# Patient Record
Sex: FEMALE | Race: WHITE | NOT HISPANIC OR LATINO | Employment: FULL TIME
[De-identification: names, ages, dates, MRNs, and addresses within clinical notes are randomized per-mention and may not be internally consistent; named-entity substitution may affect disease eponyms.]

---

## 2021-04-24 ENCOUNTER — HEALTH MAINTENANCE LETTER (OUTPATIENT)
Age: 41
End: 2021-04-24

## 2021-08-08 ENCOUNTER — NURSE TRIAGE (OUTPATIENT)
Dept: NURSING | Facility: CLINIC | Age: 41
End: 2021-08-08

## 2021-08-08 ENCOUNTER — OFFICE VISIT (OUTPATIENT)
Dept: URGENT CARE | Facility: URGENT CARE | Age: 41
End: 2021-08-08
Payer: COMMERCIAL

## 2021-08-08 VITALS
TEMPERATURE: 97.9 F | WEIGHT: 140 LBS | DIASTOLIC BLOOD PRESSURE: 68 MMHG | SYSTOLIC BLOOD PRESSURE: 103 MMHG | HEART RATE: 93 BPM | OXYGEN SATURATION: 96 %
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
## 2021-08-08 PROCEDURE — 99203 OFFICE O/P NEW LOW 30 MIN: CPT | Performed by: FAMILY MEDICINE

## 2021-08-08 RX ORDER — DOXYCYCLINE 100 MG/1
100 CAPSULE ORAL 2 TIMES DAILY
Qty: 42 CAPSULE | Refills: 0 | Status: SHIPPED | OUTPATIENT
Start: 2021-08-08 | End: 2021-08-29

## 2021-08-08 NOTE — PATIENT INSTRUCTIONS
"  Patient Education     Tick Bites  Ticks are small arachnids that feed on the blood of rodents, rabbits, birds, deer, dogs, and people. A tick bite may cause redness, itching, and slight swelling at the site. Sometimes you may have no reaction where the tick bit you.  Ticks transmit disease when microbes in their saliva get into your skin and blood. There are over 800 species of ticks. But only two families of ticks, hard ticks and soft ticks, are known to transmit diseases to humans. Ticks often transmit a disease near the end of a meal. The hard ticks tend to attach and feed for hours to days. It may take hours before a hard tick transmits microbes. Soft ticks often feed for less than 1 hour and can transmit diseases quickly. The bites themselves aren't cause for concern. But ticks can carry and pass on 12 different illnesses. These include Lyme disease and [place] spotted fever.  Symptoms of tick-related diseases vary depending on the disease. The most common symptoms are:    Fever    Chills    Aches and pains such as headache, extreme tiredness (fatigue), and muscle aches    Joint pain (with Lyme disease)    Rash     A \"bull's eye\" rash is a common symptom of Lyme disease.   How to remove a tick  Not all ticks carry disease. A tick attached to you anywhere from minutes to days may infect you, depending on the type of tick and the germs it carries. If you find a tick, don't panic.    Try to carefully remove the tick with tweezers.    Grasp the tick near its head as close to the skin s surface as possible. Pull without twisting and don't crush the body.    After removing the tick, thoroughly clean the bite area and your hands with rubbing alcohol or soap and water.    Put a live tick in alcohol, or in a sealed bag or container, or flush it down the toilet.  When to get medical care  If you can't easily remove the tick or if you leave the head in your skin, get medical care right away.  Tick paralysis is a " rare disease thought to be caused by a toxin in tick saliva. The symptoms include:    Weakness    Paralysis    Confusion    Fever    Numbness    Headaches    Rashes  If you or someone bitten by a tick has these symptoms, get medical care right away. Removing the tick stops the symptoms in about 24 hours.  If you have a rash or fever within a few weeks of removing a tick, see your healthcare provider. Tell the provider about your recent tick bite, when the bite occurred, and where you most likely acquired the tick.    To prevent disease, you may be given antibiotics. Both Lyme disease and [place] spotted fever respond quickly to these medicines.    You may be asked to see your healthcare provider for a blood test. This is to check for Lyme or another tick-related disease.  Follow-up care  Some states and [facility] have services that test ticks for Lyme disease and other diseases. Check with your local officials to see if this service is available in your area.  If you remove a tick yourself, watch for signs of a tick-borne illness. Symptoms may show up in a few days or weeks after a bite. Call your healthcare provider if you notice any of the following:    Rash. This may spread outward in a ring from a hard, white lump. Or it may move up your arms and legs to your chest.    Fever    Chills    Body aches, joint swelling, and pain    Severe headache  [employer] last reviewed this educational content on 8/1/2019 2000-2021 [employer]. All rights reserved. This information is not intended as a substitute for professional medical care. Always follow your healthcare professional's instructions.

## 2021-08-08 NOTE — TELEPHONE ENCOUNTER
Tick yesterday. Black legged tick.   Unknown how long the tick was attached. She thinks it was over 24 hours.   Tick is on the back and not able to see if she removed the head. Advised [facility] for evaluation. She will go to [facility] bc of the anxiety of having a tick, she wants doxycycline.     COVID 19 Nurse Triage Plan/Patient Instructions    Please be aware that novel coronavirus (COVID-19) may be circulating in the community. If you develop symptoms such as fever, cough, or SOB or if you have concerns about the presence of another infection including coronavirus (COVID-19), please contact your health care provider or visit [email or web address].     Disposition/Instructions    In-Person Visit with provider recommended. Reference Visit Selection Guide.    Thank you for taking steps to prevent the spread of this virus.  o Limit your contact with others.  o Wear a simple mask to cover your cough.  o Wash your hands well and often.    Resources    [facility]: About COVID-19: [email or web address]    CDC: What to Do If You're Sick: www.cdc.gov/coronavirus/2019-ncov/about/steps-when-sick.html    CDC: Ending Home Isolation: www.cdc.gov/coronavirus/2019-ncov/hcp/disposition-in-home-patients.html     CDC: Caring for Someone: www.cdc.gov/coronavirus/2019-ncov/if-you-are-sick/care-for-someone.html     [facility]: Interim Guidance for Hospital Discharge to Home: www.health.[facility].mn.us/diseases/coronavirus/hcp/hospdischarge.pdf    [facility] clinical trials (COVID-19 research studies): [email or web address]-trials     Below are the COVID-19 hotlines at the [facility] of Health ([facility]). Interpreters are available.   o For health questions: Call 693-403-2237 or 1-517.641.9264 (7 a.m. to 7 p.m.)  o For questions about schools and childcare: Call 015-328-9878 or 1-853.585.7884 (7 a.m. to 7 p.m.)                     Nadiya Mast RN on 8/8/2021 at 7:42 AM    Reason for Disposition     Can't remove tick's head that was broken off in the skin (after trying Care Advice)     She is unsure if the head has been removed and not looking as its on her back.    Additional Information    Negative: Sounds like a life-threatening emergency to the triager    Negative: Not a tick bite    Negative: [1] 2 to 14 days following tick bite AND [2] severe headache with fever occurs    Negative: [1] 2 to 14 days following tick bite AND [2] widespread rash with fever occurs    Negative: Patient sounds very sick or weak to the triager    Negative: [1] Fever AND [2] area is very tender to touch    Negative: [1] Fever AND [2] red area    Negative: [1] Red streak or red line AND [2] length > 2 inches (5 cm)    Negative: Can't remove live tick (after trying Care Advice)    Protocols used: TICK BITE-A-

## 2021-08-09 NOTE — PROGRESS NOTES
SUBJECTIVE:  Andressa Servin, a 40 year old female scheduled an appointment to discuss the following issues:  Tick bite, initial encounter    Medical, social, surgical, and family histories reviewed.     Urgent Care   Tick Bite (c/o tick bite for 2 days)    Tick bite on her back noticed 2 days ago.  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  Unsure of the length of tick attachment.  No joint pain.  No erythema migrans or rash.    ROS:  See HPI.  No nausea/vomiting.  No fever/chills.  No chest pain/SOB.  No BM/urine problems.  No dizziness or syncope.      OBJECTIVE:  /68   Pulse 93   Temp 97.9  F (36.6  C) (Tympanic)   Wt 63.5 kg (140 lb)   LMP 08/02/2021   SpO2 96%   EXAM:  GENERAL APPEARANCE: alert and no distress, afebrile  HENT: normal and no adenopathy  RESP: lungs clear to auscultation - no rales, rhonchi or wheezes  CV: regular rates and rhythm, normal S1 S2, no S3 or S4 and no murmur, click or rub  ABDOMEN: soft, non-tender  MS: extremities normal- no gross deformities noted; back--no rash or lesion noted  SKIN: no suspicious lesions or rashes  NEURO: Normal strength and tone, mentation intact and speech normal      ASSESSMENT/PLAN:  (W57.XXXA) Tick bite, initial encounter  (primary encounter diagnosis)  Comment: advised that usually 2 tabs of Doxycycline 100mg PO is sufficient for prophylaxis.  Plan: doxycycline hyclate (VIBRAMYCIN) 100 MG capsule  Warned of possible side effects of Doxycycline.   Pt to f/up PCP if new problems arise.  Warning signs and symptoms explained---be seen ASAP if worsening.

## 2021-10-09 ENCOUNTER — HEALTH MAINTENANCE LETTER (OUTPATIENT)
Age: 41
End: 2021-10-09

## 2022-05-16 ENCOUNTER — HEALTH MAINTENANCE LETTER (OUTPATIENT)
Age: 42
End: 2022-05-16

## 2022-09-11 ENCOUNTER — HEALTH MAINTENANCE LETTER (OUTPATIENT)
Age: 42
End: 2022-09-11

## 2022-12-10 PROCEDURE — 99234 HOSP IP/OBS SM DT SF/LOW 45: CPT | Performed by: PHYSICIAN ASSISTANT

## 2022-12-10 PROCEDURE — 99285 EMERGENCY DEPT VISIT HI MDM: CPT | Mod: CS,25

## 2022-12-10 PROCEDURE — C9803 HOPD COVID-19 SPEC COLLECT: HCPCS

## 2022-12-10 PROCEDURE — 99285 EMERGENCY DEPT VISIT HI MDM: CPT | Mod: CS | Performed by: EMERGENCY MEDICINE

## 2022-12-11 ENCOUNTER — APPOINTMENT (OUTPATIENT)
Dept: ULTRASOUND IMAGING | Facility: CLINIC | Age: 42
End: 2022-12-11
Attending: EMERGENCY MEDICINE
Payer: COMMERCIAL

## 2022-12-11 ENCOUNTER — HOSPITAL ENCOUNTER (OUTPATIENT)
Facility: CLINIC | Age: 42
Setting detail: OBSERVATION
Discharge: HOME | End: 2022-12-11
Attending: EMERGENCY MEDICINE | Admitting: PHYSICIAN ASSISTANT
Payer: COMMERCIAL

## 2022-12-11 ENCOUNTER — APPOINTMENT (OUTPATIENT)
Dept: CT IMAGING | Facility: CLINIC | Age: 42
End: 2022-12-11
Attending: EMERGENCY MEDICINE
Payer: COMMERCIAL

## 2022-12-11 ENCOUNTER — APPOINTMENT (OUTPATIENT)
Dept: MRI IMAGING | Facility: CLINIC | Age: 42
End: 2022-12-11
Attending: EMERGENCY MEDICINE
Payer: COMMERCIAL

## 2022-12-11 VITALS
TEMPERATURE: 97.5 F | BODY MASS INDEX: 24.11 KG/M2 | SYSTOLIC BLOOD PRESSURE: 106 MMHG | HEIGHT: 66 IN | RESPIRATION RATE: 16 BRPM | HEART RATE: 80 BPM | DIASTOLIC BLOOD PRESSURE: 74 MMHG | OXYGEN SATURATION: 100 % | WEIGHT: 150 LBS

## 2022-12-11 DIAGNOSIS — Z11.52 ENCOUNTER FOR SCREENING LABORATORY TESTING FOR SEVERE ACUTE RESPIRATORY SYNDROME CORONAVIRUS 2 (SARS-COV-2): ICD-10-CM

## 2022-12-11 DIAGNOSIS — K80.80 BILIARY CALCULUS OF OTHER SITE WITHOUT OBSTRUCTION: ICD-10-CM

## 2022-12-11 LAB
ALBUMIN SERPL BCG-MCNC: 4.2 G/DL (ref 3.5–5.2)
ALBUMIN UR-MCNC: 30 MG/DL
ALP SERPL-CCNC: 49 U/L (ref 35–104)
ALT SERPL W P-5'-P-CCNC: 13 U/L (ref 10–35)
ANION GAP SERPL CALCULATED.3IONS-SCNC: 13 MMOL/L (ref 7–15)
APPEARANCE UR: ABNORMAL
AST SERPL W P-5'-P-CCNC: 29 U/L (ref 10–35)
BACTERIA #/AREA URNS HPF: ABNORMAL /HPF
BASOPHILS # BLD AUTO: 0.1 10E3/UL (ref 0–0.2)
BASOPHILS NFR BLD AUTO: 0 %
BILIRUB SERPL-MCNC: 0.2 MG/DL
BILIRUB UR QL STRIP: NEGATIVE
BUN SERPL-MCNC: 11.8 MG/DL (ref 6–20)
CALCIUM SERPL-MCNC: 9.2 MG/DL (ref 8.6–10)
CHLORIDE SERPL-SCNC: 106 MMOL/L (ref 98–107)
COLOR UR AUTO: YELLOW
CREAT SERPL-MCNC: 0.73 MG/DL (ref 0.51–0.95)
CRP SERPL-MCNC: <3 MG/L
DEPRECATED HCO3 PLAS-SCNC: 21 MMOL/L (ref 22–29)
EOSINOPHIL # BLD AUTO: 0.1 10E3/UL (ref 0–0.7)
EOSINOPHIL NFR BLD AUTO: 1 %
ERYTHROCYTE [DISTWIDTH] IN BLOOD BY AUTOMATED COUNT: 15 % (ref 10–15)
GFR SERPL CREATININE-BSD FRML MDRD: >90 ML/MIN/1.73M2
GLUCOSE SERPL-MCNC: 158 MG/DL (ref 70–99)
GLUCOSE UR STRIP-MCNC: NEGATIVE MG/DL
HCG SERPL QL: NEGATIVE
HCT VFR BLD AUTO: 35.8 % (ref 35–47)
HGB BLD-MCNC: 11 G/DL (ref 11.7–15.7)
HGB UR QL STRIP: NEGATIVE
HOLD SPECIMEN: NORMAL
IMM GRANULOCYTES # BLD: 0.1 10E3/UL
IMM GRANULOCYTES NFR BLD: 0 %
KETONES UR STRIP-MCNC: NEGATIVE MG/DL
LEUKOCYTE ESTERASE UR QL STRIP: ABNORMAL
LIPASE SERPL-CCNC: 51 U/L (ref 13–60)
LYMPHOCYTES # BLD AUTO: 2.6 10E3/UL (ref 0.8–5.3)
LYMPHOCYTES NFR BLD AUTO: 22 %
MAGNESIUM SERPL-MCNC: 1.8 MG/DL (ref 1.7–2.3)
MCH RBC QN AUTO: 24.2 PG (ref 26.5–33)
MCHC RBC AUTO-ENTMCNC: 30.7 G/DL (ref 31.5–36.5)
MCV RBC AUTO: 79 FL (ref 78–100)
MONOCYTES # BLD AUTO: 0.7 10E3/UL (ref 0–1.3)
MONOCYTES NFR BLD AUTO: 6 %
MUCOUS THREADS #/AREA URNS LPF: PRESENT /LPF
NEUTROPHILS # BLD AUTO: 8.5 10E3/UL (ref 1.6–8.3)
NEUTROPHILS NFR BLD AUTO: 71 %
NITRATE UR QL: NEGATIVE
NRBC # BLD AUTO: 0 10E3/UL
NRBC BLD AUTO-RTO: 0 /100
PH UR STRIP: 5 [PH] (ref 5–7)
PHOSPHATE SERPL-MCNC: 3.6 MG/DL (ref 2.5–4.5)
PLATELET # BLD AUTO: 385 10E3/UL (ref 150–450)
POTASSIUM SERPL-SCNC: 4.2 MMOL/L (ref 3.4–5.3)
PROT SERPL-MCNC: 7 G/DL (ref 6.4–8.3)
RADIOLOGIST FLAGS: ABNORMAL
RBC # BLD AUTO: 4.55 10E6/UL (ref 3.8–5.2)
RBC URINE: 2 /HPF
SARS-COV-2 RNA RESP QL NAA+PROBE: NEGATIVE
SODIUM SERPL-SCNC: 140 MMOL/L (ref 136–145)
SP GR UR STRIP: 1.03 (ref 1–1.03)
SQUAMOUS EPITHELIAL: 5 /HPF
UROBILINOGEN UR STRIP-MCNC: NORMAL MG/DL
WBC # BLD AUTO: 12 10E3/UL (ref 4–11)
WBC URINE: 7 /HPF

## 2022-12-11 PROCEDURE — 85025 COMPLETE CBC W/AUTO DIFF WBC: CPT | Performed by: EMERGENCY MEDICINE

## 2022-12-11 PROCEDURE — 96361 HYDRATE IV INFUSION ADD-ON: CPT

## 2022-12-11 PROCEDURE — 96360 HYDRATION IV INFUSION INIT: CPT | Mod: XU

## 2022-12-11 PROCEDURE — G0378 HOSPITAL OBSERVATION PER HR: HCPCS

## 2022-12-11 PROCEDURE — 99204 OFFICE O/P NEW MOD 45 MIN: CPT | Performed by: SURGERY

## 2022-12-11 PROCEDURE — 84703 CHORIONIC GONADOTROPIN ASSAY: CPT | Performed by: EMERGENCY MEDICINE

## 2022-12-11 PROCEDURE — 74181 MRI ABDOMEN W/O CONTRAST: CPT | Mod: 26 | Performed by: RADIOLOGY

## 2022-12-11 PROCEDURE — 99204 OFFICE O/P NEW MOD 45 MIN: CPT | Mod: GC | Performed by: INTERNAL MEDICINE

## 2022-12-11 PROCEDURE — 250N000011 HC RX IP 250 OP 636: Performed by: EMERGENCY MEDICINE

## 2022-12-11 PROCEDURE — 83690 ASSAY OF LIPASE: CPT | Performed by: EMERGENCY MEDICINE

## 2022-12-11 PROCEDURE — 76705 ECHO EXAM OF ABDOMEN: CPT

## 2022-12-11 PROCEDURE — 36415 COLL VENOUS BLD VENIPUNCTURE: CPT | Performed by: EMERGENCY MEDICINE

## 2022-12-11 PROCEDURE — 76705 ECHO EXAM OF ABDOMEN: CPT | Mod: 26 | Performed by: RADIOLOGY

## 2022-12-11 PROCEDURE — 74181 MRI ABDOMEN W/O CONTRAST: CPT

## 2022-12-11 PROCEDURE — 81003 URINALYSIS AUTO W/O SCOPE: CPT | Performed by: EMERGENCY MEDICINE

## 2022-12-11 PROCEDURE — 74177 CT ABD & PELVIS W/CONTRAST: CPT

## 2022-12-11 PROCEDURE — 86140 C-REACTIVE PROTEIN: CPT | Performed by: PHYSICIAN ASSISTANT

## 2022-12-11 PROCEDURE — 258N000003 HC RX IP 258 OP 636: Performed by: PHYSICIAN ASSISTANT

## 2022-12-11 PROCEDURE — U0003 INFECTIOUS AGENT DETECTION BY NUCLEIC ACID (DNA OR RNA); SEVERE ACUTE RESPIRATORY SYNDROME CORONAVIRUS 2 (SARS-COV-2) (CORONAVIRUS DISEASE [COVID-19]), AMPLIFIED PROBE TECHNIQUE, MAKING USE OF HIGH THROUGHPUT TECHNOLOGIES AS DESCRIBED BY CMS-2020-01-R: HCPCS | Performed by: EMERGENCY MEDICINE

## 2022-12-11 PROCEDURE — 250N000013 HC RX MED GY IP 250 OP 250 PS 637: Performed by: PHYSICIAN ASSISTANT

## 2022-12-11 PROCEDURE — 84100 ASSAY OF PHOSPHORUS: CPT | Performed by: PHYSICIAN ASSISTANT

## 2022-12-11 PROCEDURE — 74177 CT ABD & PELVIS W/CONTRAST: CPT | Mod: 26 | Performed by: RADIOLOGY

## 2022-12-11 PROCEDURE — 80053 COMPREHEN METABOLIC PANEL: CPT | Performed by: EMERGENCY MEDICINE

## 2022-12-11 PROCEDURE — 83735 ASSAY OF MAGNESIUM: CPT | Performed by: PHYSICIAN ASSISTANT

## 2022-12-11 RX ORDER — PROCHLORPERAZINE MALEATE 10 MG
10 TABLET ORAL EVERY 6 HOURS PRN
Status: DISCONTINUED | OUTPATIENT
Start: 2022-12-11 | End: 2022-12-11 | Stop reason: HOSPADM

## 2022-12-11 RX ORDER — ONDANSETRON 4 MG/1
4 TABLET, ORALLY DISINTEGRATING ORAL EVERY 6 HOURS PRN
Status: DISCONTINUED | OUTPATIENT
Start: 2022-12-11 | End: 2022-12-11 | Stop reason: HOSPADM

## 2022-12-11 RX ORDER — IOPAMIDOL 755 MG/ML
73 INJECTION, SOLUTION INTRAVASCULAR ONCE
Status: COMPLETED | OUTPATIENT
Start: 2022-12-11 | End: 2022-12-11

## 2022-12-11 RX ORDER — KETOROLAC TROMETHAMINE 30 MG/ML
30 INJECTION, SOLUTION INTRAMUSCULAR; INTRAVENOUS EVERY 6 HOURS PRN
Status: DISCONTINUED | OUTPATIENT
Start: 2022-12-11 | End: 2022-12-11 | Stop reason: HOSPADM

## 2022-12-11 RX ORDER — NALOXONE HYDROCHLORIDE 0.4 MG/ML
0.4 INJECTION, SOLUTION INTRAMUSCULAR; INTRAVENOUS; SUBCUTANEOUS
Status: DISCONTINUED | OUTPATIENT
Start: 2022-12-11 | End: 2022-12-11 | Stop reason: HOSPADM

## 2022-12-11 RX ORDER — KETOROLAC TROMETHAMINE 30 MG/ML
30 INJECTION, SOLUTION INTRAMUSCULAR; INTRAVENOUS EVERY 6 HOURS PRN
Status: DISCONTINUED | OUTPATIENT
Start: 2022-12-11 | End: 2022-12-11

## 2022-12-11 RX ORDER — SODIUM CHLORIDE 9 MG/ML
INJECTION, SOLUTION INTRAVENOUS CONTINUOUS
Status: DISCONTINUED | OUTPATIENT
Start: 2022-12-11 | End: 2022-12-11 | Stop reason: HOSPADM

## 2022-12-11 RX ORDER — PROCHLORPERAZINE 25 MG
25 SUPPOSITORY, RECTAL RECTAL EVERY 12 HOURS PRN
Status: DISCONTINUED | OUTPATIENT
Start: 2022-12-11 | End: 2022-12-11 | Stop reason: HOSPADM

## 2022-12-11 RX ORDER — METOCLOPRAMIDE HYDROCHLORIDE 5 MG/ML
10 INJECTION INTRAMUSCULAR; INTRAVENOUS EVERY 6 HOURS PRN
Status: DISCONTINUED | OUTPATIENT
Start: 2022-12-11 | End: 2022-12-11 | Stop reason: HOSPADM

## 2022-12-11 RX ORDER — ACETAMINOPHEN 650 MG/1
650 SUPPOSITORY RECTAL EVERY 6 HOURS PRN
Status: DISCONTINUED | OUTPATIENT
Start: 2022-12-11 | End: 2022-12-11 | Stop reason: HOSPADM

## 2022-12-11 RX ORDER — NALOXONE HYDROCHLORIDE 0.4 MG/ML
0.2 INJECTION, SOLUTION INTRAMUSCULAR; INTRAVENOUS; SUBCUTANEOUS
Status: DISCONTINUED | OUTPATIENT
Start: 2022-12-11 | End: 2022-12-11 | Stop reason: HOSPADM

## 2022-12-11 RX ORDER — ONDANSETRON 2 MG/ML
4 INJECTION INTRAMUSCULAR; INTRAVENOUS EVERY 6 HOURS PRN
Status: DISCONTINUED | OUTPATIENT
Start: 2022-12-11 | End: 2022-12-11 | Stop reason: HOSPADM

## 2022-12-11 RX ORDER — ACETAMINOPHEN 325 MG/1
650 TABLET ORAL EVERY 6 HOURS PRN
Status: DISCONTINUED | OUTPATIENT
Start: 2022-12-11 | End: 2022-12-11

## 2022-12-11 RX ORDER — ACETAMINOPHEN 500 MG
1000 TABLET ORAL EVERY 8 HOURS PRN
Status: DISCONTINUED | OUTPATIENT
Start: 2022-12-11 | End: 2022-12-11 | Stop reason: HOSPADM

## 2022-12-11 RX ORDER — HYDROMORPHONE HYDROCHLORIDE 1 MG/ML
0.3 INJECTION, SOLUTION INTRAMUSCULAR; INTRAVENOUS; SUBCUTANEOUS EVERY 4 HOURS PRN
Status: DISCONTINUED | OUTPATIENT
Start: 2022-12-11 | End: 2022-12-11 | Stop reason: HOSPADM

## 2022-12-11 RX ADMIN — IOPAMIDOL 73 ML: 755 INJECTION, SOLUTION INTRAVENOUS at 02:12

## 2022-12-11 RX ADMIN — SODIUM CHLORIDE: 9 INJECTION, SOLUTION INTRAVENOUS at 07:54

## 2022-12-11 RX ADMIN — ACETAMINOPHEN 1000 MG: 500 TABLET ORAL at 13:39

## 2022-12-11 ASSESSMENT — ACTIVITIES OF DAILY LIVING (ADL)
ADLS_ACUITY_SCORE: 35
ADLS_ACUITY_SCORE: 33
ADLS_ACUITY_SCORE: 35
ADLS_ACUITY_SCORE: 31
ADLS_ACUITY_SCORE: 33

## 2022-12-11 NOTE — PROGRESS NOTES
"Goal Outcome Evaluation:  BP 95/60 (BP Location: Right arm)   Pulse 72   Temp 98.5  F (36.9  C)   Resp 18   Ht 1.676 m (5' 6\")   Wt 68 kg (150 lb)   SpO2 98%   BMI 24.21 kg/m        -diagnostic tests and consults completed and resulted-not met  -vital signs normal or at patient baseline-met  -tolerating oral intake to maintain hydration- not met, NPO for GI consult  -adequate pain control on oral analgesics- met, denies pain, has not received anlgesics  -returns to baseline functional status-met  -safe disposition plan has been identified-progressing  -MRCP completed-met  -GI consult completed-not met          "

## 2022-12-11 NOTE — CONSULTS
General Surgery Consult Note  12/11/2022     Date of admission: 12/11/2022    Reason for Consult: RUQ pain    Consulting Service: ED      Assessment/Plan:  41yo woman without significant past medical history who presents with one day of RUQ abdominal pain and vomiting, now resolved. Vitals and labs normal. Initial RUQ US with dilated CBD and cholelithiasis. GI consulted, MRCP shows cholelithiasis and adenomyomatosis without evidence of choledocholithiasis.     - can discharge and follow up in general surgery clinic for evaluation for elective cholecystectomy   - recommend follow up of anemia found on labs today    Discussed with chief resident and staff surgeon, who agree with plan.    Precious Horner MD  Surgery PGY-2     -----------------------------------------------------------  CC: abdominal pain    HPI: 41yo woman without PMHx who presents with one day of abdominal pain and vomiting. Pain began yesterday after dinner as epigastric, then migrated to RUQ. Did not resolve so she came into ED overnight. Early morning she had multiple episodes of emesis. She felt a similar pain over a year ago. Pain has since resolved. She feels hungry currently. No fevers, no CP or SOB, no change in BM or urination. No other concerns.     In ED, afebrile with normal vitals. WBC 12 otherwise labs normal. MRCP without choledocholithiasis. 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     ROS: Negative except mentioned above in HPI    Past Medical Hx:  None    Medications:  None    Past Surgical Hx:  None    Family Hx:  No family history of bleeding or clotting disorders    Social Hx:  Social History     Socioeconomic History     Marital status:      Spouse name: Not on file     Number of children: Not on file     Years of education: Not on file     Highest education level: Not on file   Occupational History     Not on  file   Tobacco Use     Smoking status: Never     Smokeless tobacco: Never   Substance and Sexual Activity     Alcohol use: Not on file     Drug use: Not on file     Sexual activity: Not on file   Other Topics Concern     Not on file   Social History Narrative     Not on file     Social Determinants of Health     Financial Resource Strain: Not on file   Food Insecurity: Not on file   Transportation Needs: Not on file   Physical Activity: Not on file   Stress: Not on file   Social Connections: Not on file   Intimate Partner Violence: Not on file   Housing Stability: Not on file        Vitals: Temp: 98.5  F (36.9  C) Temp src: Oral BP: 95/60 Pulse: 72   Resp: 18 SpO2: 98 % O2 Device: None (Room air)       Exam:  General: awake, interactive, NAD  Resp: breathing comfortably on room air, no respiratory distress  CV: RR, appears well perfused  GI: abdomen soft, nondistended, nontender to palpation of RUQ  Extremities: wwp  Neuro: A&O, cranial nerves grossly intact  Psych: appropriate affect    Labs/Imaging:  Results for orders placed or performed during the hospital encounter of 12/11/22 (from the past 24 hour(s))   [place] Draw    Narrative    The following orders were created for panel order [place] [facility].  Procedure                               Abnormality         Status                     ---------                               -----------         ------                     Extra Blue Top Tube[[id number]]                              Final result               Extra Red Top Tube[[id number]]                               Final result               Extra Green Top (Lithium...[[id number]]                      Final result               Extra Purple Top Tube[[id number]]                            Final result                 Please view results for these tests on the individual orders.   CBC with platelets differential    Narrative    The following orders were created for panel order CBC with platelets  differential.  Procedure                               Abnormality         Status                     ---------                               -----------         ------                     CBC with platelets and d...[[id number]]  Abnormal            Final result                 Please view results for these tests on the individual orders.   Comprehensive metabolic panel   Result Value Ref Range    Sodium 140 136 - 145 mmol/L    Potassium 4.2 3.4 - 5.3 mmol/L    Chloride 106 98 - 107 mmol/L    Carbon Dioxide (CO2) 21 (L) 22 - 29 mmol/L    Anion Gap 13 7 - 15 mmol/L    Urea Nitrogen 11.8 6.0 - 20.0 mg/dL    Creatinine 0.73 0.51 - 0.95 mg/dL    Calcium 9.2 8.6 - 10.0 mg/dL    Glucose 158 (H) 70 - 99 mg/dL    Alkaline Phosphatase 49 35 - 104 U/L    AST 29 10 - 35 U/L    ALT 13 10 - 35 U/L    Protein Total 7.0 6.4 - 8.3 g/dL    Albumin 4.2 3.5 - 5.2 g/dL    Bilirubin Total 0.2 <=1.2 mg/dL    GFR Estimate >90 >60 mL/min/1.73m2   Extra Blue Top Tube   Result Value Ref Range    Hold Specimen JIC    Extra Red Top Tube   Result Value Ref Range    Hold Specimen JIC    Extra Green Top (Lithium Heparin) Tube   Result Value Ref Range    Hold Specimen JIC    Extra Purple Top Tube   Result Value Ref Range    Hold Specimen JIC    CBC with platelets and differential   Result Value Ref Range    WBC Count 12.0 (H) 4.0 - 11.0 10e3/uL    RBC Count 4.55 3.80 - 5.20 10e6/uL    Hemoglobin 11.0 (L) 11.7 - 15.7 g/dL    Hematocrit 35.8 35.0 - 47.0 %    MCV 79 78 - 100 fL    MCH 24.2 (L) 26.5 - 33.0 pg    MCHC 30.7 (L) 31.5 - 36.5 g/dL    RDW 15.0 10.0 - 15.0 %    Platelet Count 385 150 - 450 10e3/uL    % Neutrophils 71 %    % Lymphocytes 22 %    % Monocytes 6 %    % Eosinophils 1 %    % Basophils 0 %    % Immature Granulocytes 0 %    NRBCs per 100 WBC 0 <1 /100    Absolute Neutrophils 8.5 (H) 1.6 - 8.3 10e3/uL    Absolute Lymphocytes 2.6 0.8 - 5.3 10e3/uL    Absolute Monocytes 0.7 0.0 - 1.3 10e3/uL    Absolute Eosinophils 0.1 0.0 - 0.7 10e3/uL     Absolute Basophils 0.1 0.0 - 0.2 10e3/uL    Absolute Immature Granulocytes 0.1 <=0.4 10e3/uL    Absolute NRBCs 0.0 10e3/uL   Lipase   Result Value Ref Range    Lipase 51 13 - 60 U/L   HCG qualitative pregnancy (blood)   Result Value Ref Range    hCG Serum Qualitative Negative Negative   Magnesium   Result Value Ref Range    Magnesium 1.8 1.7 - 2.3 mg/dL   Phosphorus   Result Value Ref Range    Phosphorus 3.6 2.5 - 4.5 mg/dL   CRP inflammation   Result Value Ref Range    CRP Inflammation <3.00 <5.00 mg/L   US Abdomen Limited (RUQ)   Result Value Ref Range    Radiologist flags Possible choledocholithiasis (AA)     Narrative    EXAMINATION: US ABDOMEN LIMITED, 12/11/2022 1:07 AM    COMPARISON: None.    HISTORY: ruq pain    TECHNIQUE: The abdomen was scanned in standard fashion with  specialized ultrasound transducer(s) using both grey scale and limited  color Doppler techniques.    FINDINGS:   Fluid: No evidence of ascites or pleural effusions.    Liver: The liver demonstrates normal echotexture, measuring 16.9 cm in  craniocaudal dimension. There is no focal mass. Main portal vein is  patent with antegrade flow.    Gallbladder: Multiple shadowing echogenic calculi within the  gallbladder lumen. Multiple immobile echogenic intramural foci with  comet tail artifacts likely representing adenomyomatosis. Gallbladder  wall has diffuse thickened measuring 4.5 mm, with some areas over 1  cm. Gallbladder is not distended. No pericholecystic fluid or wall  hyperemia. Negative sonographic Brown's sign.    Bile Ducts: Prominent right hepatic duct measuring 4 mm and left  hepatic duct measuring 3 mm.  The common bile duct measures 8.4 mm in  diameter.    Pancreas: Visualized portions of the head and body of the pancreas are  unremarkable.     Kidney: The right kidney measures 9.3 cm long. There is no  hydronephrosis or hydroureter, no shadowing renal calculi, cystic  lesion or mass.       Impression    IMPRESSION:   1.  Cholelithiasis with a thickened gallbladder wall and comet tail  artifact. This likely represents adenomyomatosis the gallbladder.   2. Common bile duct is dilated with mildly ectatic right and left  hepatic ducts, which may represent distal biliary obstruction.  Recommend MRCP or ERCP if there is clinical concern for  choledocholithiasis.  3. Multiple echogenic intraluminal foci with comet tail artifacts  likely represent adenomyomatosis of the gallbladder.  4. Borderline hepatomegaly.    [Critical Result: Possible choledocholithiasis]    Finding was identified on 12/11/2022 1:05 AM.     Dr. Сергей Medina was contacted by me on 12/11/2022 1:25 AM and  verbalized understanding of the critical result.     I have personally reviewed the examination and initial interpretation  and I agree with the findings.    BRYSON SEGURA MD         SYSTEM ID:  [id number]   CT Abdomen Pelvis w Contrast    Narrative    EXAM: CT ABDOMEN PELVIS W CONTRAST  LOCATION: [facility]  DATE/TIME: 12/11/2022 2:20 AM    INDICATION: ? choledocholithiasis. also with RLQ pain and elevated WBC  COMPARISON: Ultrasound 12/11/2022  TECHNIQUE: CT scan of the abdomen and pelvis was performed following injection of IV contrast. Multiplanar reformats were obtained. Dose reduction techniques were used.  CONTRAST: iopamidol (ISOVUE 370) solution 73 mL       FINDINGS:   LOWER CHEST: Mild basilar atelectasis.    HEPATOBILIARY: Mild biliary dilatation. Extra hepatic bile duct 9 mm. Common bile duct pancreatic head 6 mm, tapering distally. Hypodensity surrounding the gallbladder likely related to adenomyomatosis seen on ultrasound. Focal calcification at the   gallbladder fundus majority of cholelithiasis on ultrasound not seen on CT.    PANCREAS: Normal.    SPLEEN: Normal.    ADRENAL GLANDS: Normal.    KIDNEYS/BLADDER: Normal.    BOWEL: Normal caliber. Normal appendix. Portions of the colon are underdistended reducing  evaluation for wall thickening/colitis.    LYMPH NODES: Normal.    VASCULATURE: Unremarkable.    PELVIC ORGANS: Uterus and endometrium are prominent. Probable subserosal fibroid.    MUSCULOSKELETAL: Normal.      Impression    IMPRESSION:   1.  Cholelithiasis better seen sonographically. Mild biliary prominence.  2.  Findings suggestive of adenomyomatosis of the gallbladder.  3.  Underdistention of portions of the colon which produces evaluation for wall thickening/colitis.  4.  The appendix is normal. No bowel obstruction.  5.  Endometrial prominence. Consider nonurgent pelvic ultrasound follow-up. There is a small uterine fibroid.   UA with Microscopic reflex to Culture    Specimen: Urine, Midstream   Result Value Ref Range    Color Urine Yellow Colorless, Straw, Light Yellow, Yellow    Appearance Urine Cloudy (A) Clear    Glucose Urine Negative Negative mg/dL    Bilirubin Urine Negative Negative    Ketones Urine Negative Negative mg/dL    Specific Gravity Urine 1.035 1.003 - 1.035    Blood Urine Negative Negative    pH Urine 5.0 5.0 - 7.0    Protein Albumin Urine 30 (A) Negative mg/dL    Urobilinogen Urine Normal Normal, 2.0 mg/dL    Nitrite Urine Negative Negative    Leukocyte Esterase Urine Trace (A) Negative    Bacteria Urine Many (A) None Seen /HPF    Mucus Urine Present (A) None Seen /LPF    RBC Urine 2 <=2 /HPF    WBC Urine 7 (H) <=5 /HPF    Squamous Epithelials Urine 5 (H) <=1 /HPF    Narrative    Urine Culture not indicated   Asymptomatic COVID-19 Virus (Coronavirus) by PCR Nasopharyngeal    Specimen: Nasopharyngeal; Swab   Result Value Ref Range    SARS CoV2 PCR Negative Negative    Narrative    Testing was performed using the Xpert Xpress SARS-CoV-2 Assay on the Cep[employer] Gene-Xpert Instrument Systems. Additional information about this Emergency Use Authorization (EUA) assay can be found via the Lab Guide. This test should be ordered for the detection of SARS-CoV-2 in individuals who meet SARS-CoV-2  clinical and/or epidemiological criteria as well as from individuals without symptoms or other reasons to suspect COVID-19. Test performance for asymptomatic patients has only been established in anterior nasal swab specimens. This test is for in vitro diagnostic use under the FDA EUA for laboratories certified under CLIA to perform high complexity testing. This test has not been FDA cleared or approved. A negative result does not rule out the presence of PCR inhibitors in the specimen or target RNA concentration below the limit of detection for the assay. The possibility of a false negative should be considered if the patient's recent exposure or clinical presentation suggests COVID-19. This test was validated by [facility]. These Laboratories are certified under the Clinical Laboratory Improvement Amendments (CLIA) as qualified to perform high complexity testing.     MR Abdomen MRCP without Contrast    Narrative    MRCP Without Contrast    CLINICAL HISTORY: ? choledocholithiasis    DATE: 12/11/2022 10:13 AM    TECHNIQUE:  Images were acquired without intravenous gadolinium  contrast through the upper abdomen. The following MR images were  acquired without intravenous contrast: TrueFISP, multiplanar  T2-weighted, axial T1 in/out of phase, T2-weighted MRCP images, axial  diffusion-weighted and axial apparent diffusion coefficient.  T1-weighted images were obtained without contrast.    Comparison study: Same day CT of the abdomen and pelvis and right  upper quadrant ultrasound    FINDINGS:    Biliary Tree: Minimal intrahepatic biliary dilatation. Common bile  duct measures up to 7 mm, and is patent throughout its course. No  evidence of choledocholithiasis.    Pancreas: Normal appearance of the pancreas. No pancreatic duct  dilatation.    Liver: Normal parenchyma. A few tiny hepatic cysts are suspected in  the left hepatic lobe. No focal hepatic mass.    Gallbladder: Large gallstone within the  gallbladder. Gallbladder wall  thickening and edema without pericholecystic fluid or inflammatory  changes in the pericholecystic fat. There are a few rounded cystic  areas along the gallbladder wall towards the fundus adjacent to the  stone and possibly in the mid gallbladder, series 3 images 6-7  suggesting Rokitansky-Aschoff sinuses seen with more focal  adenomyomatosis. Of note, comet tail artifact was demonstrated on  ultrasound.    Spleen: Spleen is within normal limits.    Kidneys: Kidneys are within normal limits. No hydronephrosis.    Adrenal glands: Adrenal glands are within normal limits.    Bowel: Normal caliber of the small and large bowel.    Lymph nodes: No enlarged abdominal lymph nodes.    Blood vessels: Nonaneurysmal abdominal aorta. Portal venous patency.    Lung bases: No pleural effusions.    Bones and soft tissues: Normal marrow signal. No acute osseous  abnormalities.    Mesentery and abdominal wall: Within normal limits.    Ascites: No ascites.      Impression    IMPRESSION:   1. Patent common bile duct without evidence of choledocholithiasis.  2. Cholelithiasis. Gallbladder wall thickening and edema  redemonstrated as seen on ultrasound and CT, nonspecific. Focal areas  of comet tail artifact on ultrasound correlate with suspected focal  areas of adenomyomatosis on MRI. No significant adjacent inflammation  or fluid in the pericholecystic fat. Negative sonographic Brown sign  was reported and the patient's symptoms have reportedly resolved  clinically as per electronic medical record. If there is ongoing  concern for cholecystitis clinically, could consider follow-up nuclear  medicine HIDA scan.    I have personally reviewed the examination and initial interpretation  and I agree with the findings.    GABBI NOGUERA MD         SYSTEM ID:  [id number]

## 2022-12-11 NOTE — PROGRESS NOTES
Pt refused BMP and CBC, as she is a hard poke and her labs from last night were unremarkable. Provider aware and agreeable

## 2022-12-11 NOTE — H&P
[facility]    History and Physical - ED Observation Service      Date of Admission:  12/11/2022    Assessment & Plan      Andressa Servin is a 42 year old female admitted on 12/11/2022. She has no significant PMH who presents to the ED with right upper quadrant abdominal pain.     ##. RUQ Abdominal Pain  ##. Cholelithiasis  ##. Adenomyomatosis of Gallbladder  ##. R/o Choledocholithiasis   Reports severe right sided abdominal pain that started about 7pm yesterday after having some ice cream. Constant pain x 4 hours w/ 1 episode of non-bloody emesis after arrival to ED, currently resolved. Denies chest pain, shortness of breath, urinary symptoms,, history of kidney stones or GERD. Reports a longstanding history of intolerance to dairy, but never anything serious. In ED, HR 54, /84, RR 18, SaO2 100% on RA, Temp 97.6  F . Labs show normal CMP with glucose 158 (probably post prandial). Normal lipase. CBC with WBC 12 (likely stress related), H/H 11/35.8, abs neutrophil 8.5 otherwise normal. US abdomen cholelithiasis with a mildly thickened gallbladder wall without other sonographic findings  to suggest acute cholecystitis; common bile duct is dilated with mildly ectatic right and left hepatic ducts, which may represent distal biliary obstruction-recommend MRCP or ERCP if there is clinical concern for choledocholithiasis; multiple echogenic intraluminal foci with comet tail artifacts likely represent adenomyomatosis of the gallbladder. CT Abdomen reports cholelithiasis better seen sonographically-mild biliary prominence; adenomyomatosis of the gallbladder. In the ED patient received no analgesics however they the time patient was admitted to ED Observation she reported no pain, nausea or vomiting.   -NPO  -MIVF w/ NS 125ml/hr  -Zofran, Compazine, Reglan prn  -Tylenol prn  -Toradol prn for moderate pain  -Dilaudid prn for severe pain  -Repeat CBC, CMP at noon   -GI  consult    ##. Incidental Endometrial Prominence: CT abdomen pelvis reports endometrial prominence; there is a small uterine fibroid. Does not have a Gynecologist or PCP and no known history of. LMP ~11/24/22.  -Nonurgent pelvic ultrasound follow-up  -Referral to PCP       Diet: NPO for Medical/Clinical Reasons Except for: Ice Chips    DVT Prophylaxis: Low Risk/Ambulatory with no VTE prophylaxis indicated and Ambulate every shift  Rdz Catheter: Not present  Central Lines: None  Cardiac Monitoring: None  Code Status: Full Code      Clinically Significant Risk Factors Present on Admission                               Disposition Plan      Expected Discharge Date: 12/12/2022                The patient's care was discussed with the Attending Physician, Dr. Verdin .    KORIN Mendoza   ED Observation Service   [facility]  Securely message with the Vocera Web Console (learn more here)  Text page via [facility] Paging/Directory         ______________________________________________________________________  --    ED Attending Physician Attestation    I Simon Verdin MD, cared for this patient with the Advanced Practice Provider (TALYA). I have performed a history and physical examination of the patient independent of the TALYA. I reviewed the TALYA's documentation above and agree with the documented findings and plan of care. I personally provided a substantive portion of the care for this patient.    I personally performed the substantive portion of the medical decision making for this visit - please see the TALYA's documentation for full details.    Key management decisions made by me and carried out under my direction: GI consultation, MRCP, NPO status, pain and nausea management, and repeat LFTs.    I personally performed the substantive portion of the history for this visit - please see the TALYA's documentation for full details.    Key additional history findings  "made by me: Patient initially presented with right upper quadrant abdominal pain with an elevated white count.  Ultrasound showed cholelithiasis with a thickened gallbladder wall and a dilated common bile duct.  Her LFTs were normal.  Her pain is since improved.  She no longer has nausea and is hungry.    I personally performed the substantive portion of the physical exam - please see the TALYA's documentation for full details.  I concur with the TALYA exam findings, in addition I have noted: Currently without abdominal tenderness, rebound, or guarding.    Summary of HPI, PE, ED Course   Patient is a 42 year old female evaluated in the emergency department for abdominal pain. Exam notable for right upper quadrant abdominal pain. ED course notable for abnormal right upper quadrant ultrasound with dilated common bile duct and evidence of stones.  After the completion of care in the emergency department, the patient was admitted to observation    Critical Care & Procedures  Not applicable.    Medical Decision Making  The medical record was reviewed and interpreted.  Current labs reviewed and interpreted.  Previous labs reviewed and interpreted.  Current images reviewed and interpreted: RUQ US.      Simon Verdin MD  Emergency Medicine        Chief Complaint   Abdominal Pain    History is obtained from the patient    History of Present Illness   Per ED Note: \"Andressa Servin is a 42 year old female with no significant PMH who presents to the ED with abdominal pain. She reports that she had the onset of severe right sided abdominal pain around 4 hours ago. This pain was constant for 4 hours and nothing improved it. She had 1 episode of non-bloody emesis after arrival and pain is now improved, but not resolved. She notes that it is lower than it was at onset, but still on the right side. She denies chest pain, shortness of breath, color change in the eyes or skin, urinary symptoms, and history of kidney stones or " "GERD. She reports that she had just eaten ice cream before pain began. She typically gets upper abdominal pain when eating this particular ice cream, but this is different and worse. She reports having a longstanding history of intolerance to dairy, but never anything serious. She has history of ectopic pregnancy, but otherwise has no chronic medical problems or surgical history.\"    In the ED, HR 54, /84, RR 18, SaO2 100% on RA, Temp 97.6  F . Labs show normal CMP with glucose 158. CBC with WBC 12, H/H 11/35.8, abs neutrophil 8.5 otherwise normal. US abdomen cholelithiasis with a mildly thickened gallbladder wall without other sonographic findings  to suggest acute cholecystitis; common bile duct is dilated with mildly ectatic right and left hepatic ducts, which may represent distal biliary obstruction-recommend MRCP or ERCP if there is clinical concern for choledocholithiasis; multiple echogenic intraluminal foci with comet tail artifacts likely represent adenomyomatosis of the gallbladder. CT Abdomen reports cholelithiasis better seen sonographically-mild biliary prominence; adenomyomatosis of the gallbladder; endometrial prominence - consider nonurgent pelvic ultrasound follow-up. In the ED patient received no analgesics however they the time patient was admitted to ED Observation she reported no pain, nausea or vomiting.      Review of Systems    All other ROS negative except those mentioned in above note.      Past Medical History    I have reviewed this patient's medical history and updated it with pertinent information if needed.   No past medical history on file.    Past Surgical History   I have reviewed this patient's surgical history and updated it with pertinent information if needed.  No past surgical history on file.    Social History   I have reviewed this patient's social history and updated it with pertinent information if needed.  Social History     Tobacco Use     Smoking status: Never     " Smokeless tobacco: Never       Family History         Prior to Admission Medications   None     Allergies   Allergies   Allergen Reactions     Cephalosporins Itching, Swelling and Rash     Penicillins Itching, Swelling and Rash       Physical Exam   Vital Signs: Temp: 97.6  F (36.4  C) Temp src: Oral BP: 122/84 Pulse: 54   Resp: 18 SpO2: 100 % O2 Device: None (Room air)    Weight: 150 lbs 0 oz    Constitutional: awake, alert, cooperative, no apparent distress, and appears stated age  Eyes: Lids and lashes normal, pupils equal, round and reactive to light, extra ocular muscles intact, sclera clear, conjunctiva normal  ENT: Normocephalic, without obvious abnormality, atraumatic, sinuses nontender on palpation, external ears without lesions, oral pharynx with moist mucous membranes, tonsils without erythema or exudates, gums normal and good dentition.  Hematologic / Lymphatic: no cervical lymphadenopathy  Respiratory: No increased work of breathing, good air exchange, clear to auscultation bilaterally, no crackles or wheezing  Cardiovascular: Normal apical impulse, regular rate and rhythm, normal S1 and S2, no S3 or S4, and no murmur noted  GI: No scars, normal bowel sounds, soft, non-distended, mild RUQ tenderness, no masses palpated, no hepatosplenomegally  Skin: no bruising or bleeding  Musculoskeletal: There is no redness, warmth, or swelling of the joints.  Full range of motion noted.  Motor strength is 5 out of 5 all extremities bilaterally.  Tone is normal.  Neurologic: Awake, alert, oriented to name, place and time.  Cranial nerves II-XII are grossly intact.  Motor is 5 out of 5 bilaterally.  Cerebellar finger to nose, heel to shin intact.  Sensory is intact.  Babinski down going, Romberg negative, and gait is normal.  Neuropsychiatric: General: normal, calm and normal eye contact     Data   Data reviewed today: I reviewed all medications, new labs and imaging results over the last 24 hours. I personally  reviewed     Recent Labs   Lab 12/11/22  0010   WBC 12.0*   HGB 11.0*   MCV 79         POTASSIUM 4.2   CHLORIDE 106   CO2 21*   BUN 11.8   CR 0.73   ANIONGAP 13   FATEMEH 9.2   *   ALBUMIN 4.2   PROTTOTAL 7.0   BILITOTAL 0.2   ALKPHOS 49   ALT 13   AST 29   LIPASE 51     Most Recent 3 CBC's:  Recent Labs   Lab Test 12/11/22  0010   WBC 12.0*   HGB 11.0*   MCV 79        Most Recent 3 BMP's:  Recent Labs   Lab Test 12/11/22  0010      POTASSIUM 4.2   CHLORIDE 106   CO2 21*   BUN 11.8   CR 0.73   ANIONGAP 13   FATEMEH 9.2   *     Most Recent 2 LFT's:  Recent Labs   Lab Test 12/11/22  0010   AST 29   ALT 13   ALKPHOS 49   BILITOTAL 0.2     Most Recent Hemoglobin A1c:No lab results found.  Most Recent 6 glucoses:  Recent Labs   Lab Test 12/11/22  0010   *     Most Recent ESR & CRP:No lab results found.  12.0 (H)    \    11.0 (L)    /    385   N 71    L N/A    140    106    11.8 /   ------------------------------------ 158 (H)   ALT 13   AST 29   AP 49   ALB 4.2   Ca 9.2  4.2    21 (L)    0.73 \    % RETIC N/A    LDH N/A  Troponin N/A    BNP N/A    CK N/A  INR N/A   PTT N/A    D-dimer N/A    Fibrinogen N/A    Antithrombin N/A  Ferritin N/A  CRP N/A    IL-6 N/A  Recent Results (from the past 24 hour(s))   US Abdomen Limited (RUQ)    Impression    RESIDENT PRELIMINARY INTERPRETATION  IMPRESSION:   1. Cholelithiasis with a mildly thickened gallbladder wall without  other sonographic findings  to suggest acute cholecystitis.  2. Common bile duct is dilated with mildly ectatic right and left  hepatic ducts, which may represent distal biliary obstruction.  Recommend MRCP or ERCP if there is clinical concern for  choledocholithiasis.  3. Multiple echogenic intraluminal foci with comet tail artifacts  likely represent adenomyomatosis of the gallbladder.  4. Borderline hepatomegaly.    [Critical Result: Possible choledocholithiasis]    Finding was identified on 12/11/2022 1:05 AM.       Сергей Medina was contacted by me on 12/11/2022 1:25 AM and  verbalized understanding of the critical result.    CT Abdomen Pelvis w Contrast    Narrative    EXAM: CT ABDOMEN PELVIS W CONTRAST  LOCATION: [facility]  DATE/TIME: 12/11/2022 2:20 AM    INDICATION: ? choledocholithiasis. also with RLQ pain and elevated WBC  COMPARISON: Ultrasound 12/11/2022  TECHNIQUE: CT scan of the abdomen and pelvis was performed following injection of IV contrast. Multiplanar reformats were obtained. Dose reduction techniques were used.  CONTRAST: iopamidol (ISOVUE 370) solution 73 mL       FINDINGS:   LOWER CHEST: Mild basilar atelectasis.    HEPATOBILIARY: Mild biliary dilatation. Extra hepatic bile duct 9 mm. Common bile duct pancreatic head 6 mm, tapering distally. Hypodensity surrounding the gallbladder likely related to adenomyomatosis seen on ultrasound. Focal calcification at the   gallbladder fundus majority of cholelithiasis on ultrasound not seen on CT.    PANCREAS: Normal.    SPLEEN: Normal.    ADRENAL GLANDS: Normal.    KIDNEYS/BLADDER: Normal.    BOWEL: Normal caliber. Normal appendix. Portions of the colon are underdistended reducing evaluation for wall thickening/colitis.    LYMPH NODES: Normal.    VASCULATURE: Unremarkable.    PELVIC ORGANS: Uterus and endometrium are prominent. Probable subserosal fibroid.    MUSCULOSKELETAL: Normal.      Impression    IMPRESSION:   1.  Cholelithiasis better seen sonographically. Mild biliary prominence.  2.  Findings suggestive of adenomyomatosis of the gallbladder.  3.  Underdistention of portions of the colon which produces evaluation for wall thickening/colitis.  4.  The appendix is normal. No bowel obstruction.  5.  Endometrial prominence. Consider nonurgent pelvic ultrasound follow-up. There is a small uterine fibroid.

## 2022-12-11 NOTE — ED PROVIDER NOTES
ED Provider Note  [facility]      History     Chief Complaint   Patient presents with     Abdominal Pain     The history is provided by the patient and medical records.     Andressa Servin is a 42 year old female with no significant PMH who presents to the ED with abdominal pain. She reports that she had the onset of severe right sided abdominal pain around 4 hours ago. This pain was constant for 4 hours and nothing improved it. She had 1 episode of non-bloody emesis after arrival and pain is now improved, but not resolved. She notes that it is lower than it was at onset, but still on the right side. She denies chest pain, shortness of breath, color change in the eyes or skin, urinary symptoms, and history of kidney stones or GERD. She reports that she had just eaten ice cream before pain began. She typically gets upper abdominal pain when eating this particular ice cream, but this is different and worse. She reports having a longstanding history of intolerance to dairy, but never anything serious. She has history of ectopic pregnancy, but otherwise has no chronic medical problems or surgical history.     Past Medical History  No past medical history on file.  No past surgical history on file.  No current outpatient medications on file.    Allergies   Allergen Reactions     Cephalosporins Itching, Swelling and Rash     Penicillins Itching, Swelling and Rash     Family History  No family history on file.  Social History   Social History     Tobacco Use     Smoking status: Never     Smokeless tobacco: Never      Past medical history, past surgical history, medications, allergies, family history, and social history were reviewed with the patient. No additional pertinent items.       Review of Systems  A complete review of systems was performed with pertinent positives and negatives noted in the HPI, and all other systems negative.    Physical Exam   BP: 122/84  Pulse: 54  Temp: 97.6  F (36.4  " C)  Resp: 18  Height: 167.6 cm (5' 6\")  Weight: 68 kg (150 lb)  SpO2: 100 %  Physical Exam  Physical Exam   Constitutional: oriented to person, place, and time. appears well-developed and well-nourished.   HENT:   Head: Normocephalic and atraumatic.   Neck: Normal range of motion.   Pulmonary/Chest: Effort normal. No respiratory distress.   Cardiac: No murmurs, rubs, gallops. RRR.  Abdominal: Abdomen soft, TTP in the RUQ without guarding, nondistended. No rebound tenderness. No CVA TTP. No RLQ tenderness.  MSK: Long bones without deformity or evidence of trauma  Neurological: alert and oriented to person, place, and time.   Skin: Skin is warm and dry.   Psychiatric:  normal mood and affect.  behavior is normal. Thought content normal.     ED Course      Procedures     Results for orders placed or performed during the hospital encounter of 12/11/22   US Abdomen Limited (RUQ)     Status: None (Preliminary result)    Impression    RESIDENT PRELIMINARY INTERPRETATION  IMPRESSION:   1. Cholelithiasis with a mildly thickened gallbladder wall without  other sonographic findings  to suggest acute cholecystitis.  2. Common bile duct is dilated with mildly ectatic right and left  hepatic ducts, which may represent distal biliary obstruction.  Recommend MRCP or ERCP if there is clinical concern for  choledocholithiasis.  3. Multiple echogenic intraluminal foci with comet tail artifacts  likely represent adenomyomatosis of the gallbladder.  4. Borderline hepatomegaly.    [Critical Result: Possible choledocholithiasis]    Finding was identified on 12/11/2022 1:05 AM.     Dr. Сергей Medina was contacted by me on 12/11/2022 1:25 AM and  verbalized understanding of the critical result.    CT Abdomen Pelvis w Contrast     Status: None    Narrative    EXAM: CT ABDOMEN PELVIS W CONTRAST  LOCATION: [facility]  DATE/TIME: 12/11/2022 2:20 AM    INDICATION: ? choledocholithiasis. also with RLQ " pain and elevated WBC  COMPARISON: Ultrasound 12/11/2022  TECHNIQUE: CT scan of the abdomen and pelvis was performed following injection of IV contrast. Multiplanar reformats were obtained. Dose reduction techniques were used.  CONTRAST: iopamidol (ISOVUE 370) solution 73 mL       FINDINGS:   LOWER CHEST: Mild basilar atelectasis.    HEPATOBILIARY: Mild biliary dilatation. Extra hepatic bile duct 9 mm. Common bile duct pancreatic head 6 mm, tapering distally. Hypodensity surrounding the gallbladder likely related to adenomyomatosis seen on ultrasound. Focal calcification at the   gallbladder fundus majority of cholelithiasis on ultrasound not seen on CT.    PANCREAS: Normal.    SPLEEN: Normal.    ADRENAL GLANDS: Normal.    KIDNEYS/BLADDER: Normal.    BOWEL: Normal caliber. Normal appendix. Portions of the colon are underdistended reducing evaluation for wall thickening/colitis.    LYMPH NODES: Normal.    VASCULATURE: Unremarkable.    PELVIC ORGANS: Uterus and endometrium are prominent. Probable subserosal fibroid.    MUSCULOSKELETAL: Normal.      Impression    IMPRESSION:   1.  Cholelithiasis better seen sonographically. Mild biliary prominence.  2.  Findings suggestive of adenomyomatosis of the gallbladder.  3.  Underdistention of portions of the colon which produces evaluation for wall thickening/colitis.  4.  The appendix is normal. No bowel obstruction.  5.  Endometrial prominence. Consider nonurgent pelvic ultrasound follow-up. There is a small uterine fibroid.   [facility] Draw     Status: None    Narrative    The following orders were created for panel order [facility] Draw.  Procedure                               Abnormality         Status                     ---------                               -----------         ------                     Extra Blue Top Tube[[id number]]                              Final result               Extra Red Top Tube[[id number]]                               Final result                Extra Green Top (Lithium...[[id number]]                      Final result               Extra Purple Top Tube[[id number]]                            Final result                 Please view results for these tests on the individual orders.   Comprehensive metabolic panel     Status: Abnormal   Result Value Ref Range    Sodium 140 136 - 145 mmol/L    Potassium 4.2 3.4 - 5.3 mmol/L    Chloride 106 98 - 107 mmol/L    Carbon Dioxide (CO2) 21 (L) 22 - 29 mmol/L    Anion Gap 13 7 - 15 mmol/L    Urea Nitrogen 11.8 6.0 - 20.0 mg/dL    Creatinine 0.73 0.51 - 0.95 mg/dL    Calcium 9.2 8.6 - 10.0 mg/dL    Glucose 158 (H) 70 - 99 mg/dL    Alkaline Phosphatase 49 35 - 104 U/L    AST 29 10 - 35 U/L    ALT 13 10 - 35 U/L    Protein Total 7.0 6.4 - 8.3 g/dL    Albumin 4.2 3.5 - 5.2 g/dL    Bilirubin Total 0.2 <=1.2 mg/dL    GFR Estimate >90 >60 mL/min/1.73m2   Extra Blue Top Tube     Status: None   Result Value Ref Range    Hold Specimen JIC    Extra Red Top Tube     Status: None   Result Value Ref Range    Hold Specimen JIC    Extra Green Top (Lithium Heparin) Tube     Status: None   Result Value Ref Range    Hold Specimen JIC    Extra Purple Top Tube     Status: None   Result Value Ref Range    Hold Specimen JIC    CBC with platelets and differential     Status: Abnormal   Result Value Ref Range    WBC Count 12.0 (H) 4.0 - 11.0 10e3/uL    RBC Count 4.55 3.80 - 5.20 10e6/uL    Hemoglobin 11.0 (L) 11.7 - 15.7 g/dL    Hematocrit 35.8 35.0 - 47.0 %    MCV 79 78 - 100 fL    MCH 24.2 (L) 26.5 - 33.0 pg    MCHC 30.7 (L) 31.5 - 36.5 g/dL    RDW 15.0 10.0 - 15.0 %    Platelet Count 385 150 - 450 10e3/uL    % Neutrophils 71 %    % Lymphocytes 22 %    % Monocytes 6 %    % Eosinophils 1 %    % Basophils 0 %    % Immature Granulocytes 0 %    NRBCs per 100 WBC 0 <1 /100    Absolute Neutrophils 8.5 (H) 1.6 - 8.3 10e3/uL    Absolute Lymphocytes 2.6 0.8 - 5.3 10e3/uL    Absolute Monocytes 0.7 0.0 - 1.3 10e3/uL    Absolute Eosinophils 0.1 0.0 -  0.7 10e3/uL    Absolute Basophils 0.1 0.0 - 0.2 10e3/uL    Absolute Immature Granulocytes 0.1 <=0.4 10e3/uL    Absolute NRBCs 0.0 10e3/uL   Lipase     Status: Normal   Result Value Ref Range    Lipase 51 13 - 60 U/L   HCG qualitative pregnancy (blood)     Status: Normal   Result Value Ref Range    hCG Serum Qualitative Negative Negative   CBC with platelets differential     Status: Abnormal    Narrative    The following orders were created for panel order CBC with platelets differential.  Procedure                               Abnormality         Status                     ---------                               -----------         ------                     CBC with platelets and d...[[id number]]  Abnormal            Final result                 Please view results for these tests on the individual orders.          No results found for any visits on 12/10/22.  Medications - No data to display     Assessments & Plan (with Medical Decision Making)   MDM   patient presenting with right upper quadrant pain.  She is found to have cholelithiasis in addition to dilated common bile duct.  CT without further acute abnormalities.  Clinically does not have cholangitis or cholecystitis.  She has a mildly thickened gallbladder wall however no right upper quadrant pain repeat examination.  She does have a mild elevation of white blood count otherwise LFTs, alk phos and bilirubin are normal.  Discussed with gastroenterology who would prefer to get an MRCP.  Patient will go to the observation unit to get this and possible GI consult.    I have reviewed the nursing notes. I have reviewed the findings, diagnosis, plan and need for follow up with the patient.    New Prescriptions    No medications on file       Final diagnoses:   Biliary calculus of other site without obstruction   I, Jessie Sheets, am serving as a trained medical scribe to document services personally performed by Yasir Medina MD, based on the provider's  statements to me.     I, Yasir Medina MD, was physically present and have reviewed and verified the accuracy of this note documented by Jessie Sheets.      --  Yasir Medina MD    [facility] EMERGENCY DEPARTMENT  12/10/2022     Yasir Medina MD  12/11/22 [id number]

## 2022-12-11 NOTE — ED TRIAGE NOTES
Patient states she has a terrible tummy ache. She does not know what happened. She had an ache in her upper abdomen slightly on right side, then it traveled down her right side and has gotten worse. It started around 2000. She has not had abdominal pain that she had to go to the hospital before  VSS

## 2022-12-11 NOTE — PLAN OF CARE
"Goal Outcome Evaluation:  BP 97/68 (BP Location: Right arm)   Pulse 80   Temp 97.8  F (36.6  C) (Oral)   Resp 16   Ht 1.676 m (5' 6\")   Wt 68 kg (150 lb)   SpO2 98%   BMI 24.21 kg/m       -diagnostic tests and consults completed and resulted-not met  -vital signs normal or at patient baseline-met  -tolerating oral intake to maintain hydration- not met, NPO for GI consult  -adequate pain control on oral analgesics- met, denies pain, has not received anlgesics  -returns to baseline functional status-met  -safe disposition plan has been identified-progressing  -MRCP completed-not met  -GI consult completed-not met  "

## 2022-12-11 NOTE — CONSULTS
[facility]  GASTROENTEROLOGY CONSULTATION      Date of Admission:     12/11/2022  Requesting physician: Dr. Brown att. providers found             Reason for Consultation:   We were asked by Yasir Medina  to evaluate this patient with RUQ pain and cholelithiasis.     History is obtained from the patient         ASSESSMENT AND RECOMMENDATIONS:   Assessment:  42 year old female with no past medical history who presents to the ED with RUQ pain and nonbloody emesis. Work showed normal LFTs however US noted cholelithiasis and dilated common bile duct. GI team consulted for concern of choledocholithiasis.     # Symptomatic cholelithiasis   Patient has normal LFTs and on review of MRCP imaging she has no evidence of common bile duct stones that would prompt need for EUS or ERCP at this time. Given symptoms and presence of gallstones, she should be seen by general surgery for consideration of cholecystectomy.        Recommendations  - No indication for ERCP for further evaluation at this time.  - General surgery consult for evaluation of symptomatic cholelithiasis and consideration of cholecystectomy. Intra operative cholangiogram can be done at the time of cholecystectomy to evaluate for tiny CBD stones. If stones are present, they are very small and not clinically significant at this time.           Thank you for involving us in this patient's care. Please do not hesitate to contact the GI service with any questions or concerns.     Pt care plan discussed with Dr. Mccracken, GI staff physician.    David Mccracken MD  Gastroenterology Fellow - PGY4  [facility]     Seen and examined with GI fellow, agree with findings and recommendations.  Cholecystectomy as appropriate  Sundeep Mccracken MD GI Staff      -------------------------------------------------------------------------------------------------------------------           History of Present Illness:   Andressa Servin is a 42 year old  female no past medical history presents to the emergency room last evening with significant right upper quadrant pain and 1 episode of emesis.  Notes pain started after eating ice cream and lasted for several hours until she came to the ED and received pain medication.  Pain has not recurred.  Had similar pain about a year ago when eating dairy products.  No nausea, fevers, diarrhea.    She had normal LFTs.  Abdominal ultrasound showed gallstones and mildly dilated common bile duct.  GI consulted for possible choledocholithiasis.    She is resting comfortably in the emergency room.  No current pain, nausea or vomiting.            Past Medical History:   Reviewed and edited as appropriate  No past medical history on file.         Past Surgical History:   Reviewed and edited as appropriate   No past surgical history on file.           Social History:   Reviewed and edited as appropriate  Social History     Socioeconomic History     Marital status:      Spouse name: Not on file     Number of children: Not on file     Years of education: Not on file     Highest education level: Not on file   Occupational History     Not on file   Tobacco Use     Smoking status: Never     Smokeless tobacco: Never   Substance and Sexual Activity     Alcohol use: Not on file     Drug use: Not on file     Sexual activity: Not on file   Other Topics Concern     Not on file   Social History Narrative     Not on file     Social Determinants of Health     Financial Resource Strain: Not on file   Food Insecurity: Not on file   Transportation Needs: Not on file   Physical Activity: Not on file   Stress: Not on file   Social Connections: Not on file   Intimate Partner Violence: Not on file   Housing Stability: Not on file            Family History:   Reviewed and edited as appropriate  No family history on file.         Allergies:   Reviewed and edited as appropriate     Allergies   Allergen Reactions     Cephalosporins Itching, Swelling  "and Rash     Penicillins Itching, Swelling and Rash              Review of Systems:   A complete 10 point review of systems was obtained.  Please see the HPI for pertinent positives and negatives.    All other systems were reviewed and were found to be negative.            Physical Exam:   /74   Pulse 80   Temp 97.5  F (36.4  C)   Resp 16   Ht 1.676 m (5' 6\")   Wt 68 kg (150 lb)   SpO2 100%   BMI 24.21 kg/m    Wt:   Wt Readings from Last 2 Encounters:   12/10/22 68 kg (150 lb)   08/08/21 63.5 kg (140 lb)      Constitutional: cooperative, pleasant, not dyspneic/diaphoretic, no acute distress  Eyes: Sclera anicteric  Ears/nose/mouth/throat: Normal oropharynx without ulcers or exudate, mucus membranes moist, hearing intact  CV: No edema  Respiratory: Unlabored breathing  Abd: Nondistended, +bs, no hepatosplenomegaly, nontender, no peritoneal signs  Skin: warm, perfused, no jaundice  Neuro: AAO x 3, No asterixis  Psych: Normal affect  MSK: No gross deformities         Data:   Labs and imaging below were independently reviewed and interpreted    BMP  Recent Labs   Lab 12/11/22  0010      POTASSIUM 4.2   CHLORIDE 106   FATEMEH 9.2   CO2 21*   BUN 11.8   CR 0.73   *     CBC  Recent Labs   Lab 12/11/22  0010   WBC 12.0*   RBC 4.55   HGB 11.0*   HCT 35.8   MCV 79   MCH 24.2*   MCHC 30.7*   RDW 15.0        INRNo lab results found in last 7 days.  LFTs  Recent Labs   Lab 12/11/22  0010   ALKPHOS 49   AST 29   ALT 13   BILITOTAL 0.2   PROTTOTAL 7.0   ALBUMIN 4.2      PANC  Recent Labs   Lab 12/11/22  0010   LIPASE 51       Imaging:  Reviewed ultrasound and CT scans.  Reviewed MRCP  images, no evidence of choledocholithiasis.  "

## 2022-12-11 NOTE — PROGRESS NOTES
DC instructions given to pt and verbalized understanding.  All belongings with pt, IV DC'd and documented. Pt discharged home ride provided by family member

## 2022-12-15 NOTE — DISCHARGE SUMMARY
"ED Observation Discharge Summary  [facility]  Discharge Date: 12/11/2022    Andressa Servin MRN: [id number]   Age: 42 year old YOB: 1980     Interval History   Patient has had minimal pain throughout the day without nausea.  Her vital signs are stable.  She is awaiting MRCP and GI consultation.  She is not requiring medications for pain or nausea.       Physical Exam   /74   Pulse 80   Temp 97.5  F (36.4  C)   Resp 16   Ht 1.676 m (5' 6\")   Wt 68 kg (150 lb)   SpO2 100%   BMI 24.21 kg/m    Physical Exam  General: Patient is alert in no acute respiratory distress  Neck is supple without lymphadenopathy  Lungs are clear to auscultation  Heart regular rate and rhythm without murmurs  Abdomen soft and nontender.  She has no rebound or guarding.  Extremities are without edema and strong pedal pulses.    Results   All laboratory and imaging data in the past 24 hours reviewed     Gallbladder ultrasound:   Common bile duct is enlarged  Gallstones seen  Gallbladder wall thickening        Observation Course   Patient admitted to the ED Observation Unit with right upper quadrant abdominal pain.     Services consulted during the observation course: GI and General Surgery. Notable consultant recommendations include: MRCP which was performed and showed cholelithiasis and adenomyomatosis without evidence of choledocholithiasis.  They recommended general surgery consultation for discussion around timing of laparoscopic cholecystectomy.  Patient is a professor currently going into finals and would like to postpone definitive management for a little bit later when she has time built in her schedule to recover.    Patient did not want to get repeat blood work drawn as she thought that that would make her faint.  She is not having any pain and has no evidence of retained stone, so it seemed reasonable to not get a repeat comprehensive metabolic profile.      Through the patient's " time in observation care, all diagnostic and therapeutic goals were met. See earlier notes for specific goals. Nursing notes reviewed. After counseling on the diagnosis, work-up, treatment plan, and importance of follow-up, the patient was discharged. Patient to follow-up with 10 in days. Patient to return to the ED if any urgent or potentially life-threatening concerns.     Discharge Diagnoses:   Final diagnoses:   Biliary calculus of other site without obstruction       There are no discharge medications for this patient.       --  Simon Verdin MD  [facility] UNIT 6D OBSERVATION [place]  12/15/2022

## 2023-06-03 ENCOUNTER — HEALTH MAINTENANCE LETTER (OUTPATIENT)
Age: 43
End: 2023-06-03

## 2024-02-24 ENCOUNTER — HEALTH MAINTENANCE LETTER (OUTPATIENT)
Age: 44
End: 2024-02-24

## 2024-07-13 ENCOUNTER — HEALTH MAINTENANCE LETTER (OUTPATIENT)
Age: 44
End: 2024-07-13

## 2025-07-19 ENCOUNTER — HEALTH MAINTENANCE LETTER (OUTPATIENT)
Age: 45
End: 2025-07-19